# Patient Record
Sex: FEMALE | Race: WHITE | NOT HISPANIC OR LATINO | URBAN - METROPOLITAN AREA
[De-identification: names, ages, dates, MRNs, and addresses within clinical notes are randomized per-mention and may not be internally consistent; named-entity substitution may affect disease eponyms.]

---

## 2023-04-03 ENCOUNTER — WEB ENCOUNTER (OUTPATIENT)
Dept: URBAN - METROPOLITAN AREA CLINIC 82 | Facility: CLINIC | Age: 53
End: 2023-04-03

## 2023-04-03 ENCOUNTER — DASHBOARD ENCOUNTERS (OUTPATIENT)
Age: 53
End: 2023-04-03

## 2023-04-03 ENCOUNTER — OFFICE VISIT (OUTPATIENT)
Dept: URBAN - METROPOLITAN AREA CLINIC 82 | Facility: CLINIC | Age: 53
End: 2023-04-03
Payer: COMMERCIAL

## 2023-04-03 ENCOUNTER — LAB OUTSIDE AN ENCOUNTER (OUTPATIENT)
Dept: URBAN - METROPOLITAN AREA CLINIC 82 | Facility: CLINIC | Age: 53
End: 2023-04-03

## 2023-04-03 VITALS
BODY MASS INDEX: 23.62 KG/M2 | HEIGHT: 70 IN | WEIGHT: 165 LBS | TEMPERATURE: 97.2 F | HEART RATE: 71 BPM | DIASTOLIC BLOOD PRESSURE: 79 MMHG | SYSTOLIC BLOOD PRESSURE: 129 MMHG | RESPIRATION RATE: 15 BRPM

## 2023-04-03 DIAGNOSIS — K64.4 EXTERNAL HEMORRHOID: ICD-10-CM

## 2023-04-03 DIAGNOSIS — K64.8 INTERNAL HEMORRHOIDS: ICD-10-CM

## 2023-04-03 PROBLEM — 90458007: Status: ACTIVE | Noted: 2023-04-03

## 2023-04-03 PROBLEM — 23913003: Status: ACTIVE | Noted: 2023-04-03

## 2023-04-03 PROCEDURE — 99203 OFFICE O/P NEW LOW 30 MIN: CPT | Performed by: STUDENT IN AN ORGANIZED HEALTH CARE EDUCATION/TRAINING PROGRAM

## 2023-04-03 RX ORDER — HYDROCORTISONE 25 MG/G
1 APPLICATION CREAM TOPICAL TWICE A DAY
Qty: 30 GRAMS | Refills: 1 | OUTPATIENT
Start: 2023-04-03 | End: 2023-04-17

## 2023-04-03 NOTE — PHYSICAL EXAM RECTAL:
Pt gave verbal consent for rectal exam. Using a lighted anoscope lubricated w/ lidocaine, chaperone in room (MA: Andreina). Normal rectal vault, two moderate size external hemorrhoids notable, non-bleeding. No fissures. One tiny internal hemorrhoid noted, non-bleeding. Normal squeeze pressure, normal puborectalis pressure.

## 2023-04-03 NOTE — HPI-TODAY'S VISIT:
52 y.o female presents today for concerns of hemorrhoids. On going for a few years. Wants to see if she can get banding completed here. Patient reports of BRBPR once or twice a year, on toilet paper. Her main concern is the irritation from external hemorrhoids, states that its uncomfortable. BM: daily. Admits using prepH occasional, no relief. Denies any fecal incontinence, rectal pain, urgency. Colonoscopy in 2019: Diverticulosis in the sigmoid colon and in the descending colon. External and internal hemorrhoids. No specimens collected. Repeat in 2029. Patient mentioned that grandmother dx w/ CRC around 60s.